# Patient Record
Sex: MALE | Race: OTHER
[De-identification: names, ages, dates, MRNs, and addresses within clinical notes are randomized per-mention and may not be internally consistent; named-entity substitution may affect disease eponyms.]

---

## 2017-02-07 PROBLEM — Z00.00 ENCOUNTER FOR PREVENTIVE HEALTH EXAMINATION: Status: ACTIVE | Noted: 2017-02-07

## 2017-02-15 ENCOUNTER — APPOINTMENT (OUTPATIENT)
Dept: NEUROSURGERY | Facility: CLINIC | Age: 57
End: 2017-02-15

## 2017-02-15 DIAGNOSIS — M54.2 CERVICALGIA: ICD-10-CM

## 2017-02-15 DIAGNOSIS — E78.1 PURE HYPERGLYCERIDEMIA: ICD-10-CM

## 2017-02-15 DIAGNOSIS — I10 ESSENTIAL (PRIMARY) HYPERTENSION: ICD-10-CM

## 2017-02-28 ENCOUNTER — APPOINTMENT (OUTPATIENT)
Dept: NEUROSURGERY | Facility: CLINIC | Age: 57
End: 2017-02-28

## 2017-02-28 VITALS
OXYGEN SATURATION: 97 % | HEART RATE: 96 BPM | TEMPERATURE: 97.9 F | WEIGHT: 210 LBS | HEIGHT: 72 IN | DIASTOLIC BLOOD PRESSURE: 76 MMHG | SYSTOLIC BLOOD PRESSURE: 120 MMHG | BODY MASS INDEX: 28.44 KG/M2

## 2017-02-28 DIAGNOSIS — Z83.3 FAMILY HISTORY OF DIABETES MELLITUS: ICD-10-CM

## 2017-02-28 DIAGNOSIS — Z78.9 OTHER SPECIFIED HEALTH STATUS: ICD-10-CM

## 2017-02-28 DIAGNOSIS — F17.210 NICOTINE DEPENDENCE, CIGARETTES, UNCOMPLICATED: ICD-10-CM

## 2017-02-28 DIAGNOSIS — M54.2 CERVICALGIA: ICD-10-CM

## 2017-02-28 DIAGNOSIS — Z82.49 FAMILY HISTORY OF ISCHEMIC HEART DISEASE AND OTHER DISEASES OF THE CIRCULATORY SYSTEM: ICD-10-CM

## 2017-02-28 DIAGNOSIS — Z82.61 FAMILY HISTORY OF ARTHRITIS: ICD-10-CM

## 2017-02-28 DIAGNOSIS — M54.9 DORSALGIA, UNSPECIFIED: ICD-10-CM

## 2017-02-28 RX ORDER — ZOLPIDEM TARTRATE 10 MG/1
10 TABLET, FILM COATED ORAL
Refills: 0 | Status: ACTIVE | COMMUNITY

## 2017-02-28 RX ORDER — MELOXICAM 15 MG/1
15 TABLET ORAL
Refills: 0 | Status: ACTIVE | COMMUNITY

## 2017-03-01 VITALS — HEART RATE: 7 BPM

## 2017-03-01 PROBLEM — E78.1 HIGH TRIGLYCERIDES: Status: RESOLVED | Noted: 2017-03-01 | Resolved: 2017-03-01

## 2017-03-01 PROBLEM — I10 HIGH BLOOD PRESSURE: Status: RESOLVED | Noted: 2017-03-01 | Resolved: 2017-03-01

## 2017-03-01 RX ORDER — CYCLOBENZAPRINE HYDROCHLORIDE 10 MG/1
10 TABLET, FILM COATED ORAL
Refills: 0 | Status: COMPLETED | COMMUNITY
End: 2017-03-01

## 2017-03-27 ENCOUNTER — APPOINTMENT (OUTPATIENT)
Dept: NEUROSURGERY | Facility: HOSPITAL | Age: 57
End: 2017-03-27

## 2017-04-26 PROBLEM — M54.2 CERVICAL SPINE PAIN: Status: ACTIVE | Noted: 2017-02-28

## 2022-03-15 ENCOUNTER — TRANSCRIPTION ENCOUNTER (OUTPATIENT)
Age: 62
End: 2022-03-15

## 2023-10-05 ENCOUNTER — OFFICE (OUTPATIENT)
Dept: URBAN - METROPOLITAN AREA CLINIC 112 | Facility: CLINIC | Age: 63
Setting detail: OPHTHALMOLOGY
End: 2023-10-05
Payer: COMMERCIAL

## 2023-10-05 DIAGNOSIS — H25.13: ICD-10-CM

## 2023-10-05 DIAGNOSIS — H16.223: ICD-10-CM

## 2023-10-05 DIAGNOSIS — H43.392: ICD-10-CM

## 2023-10-05 DIAGNOSIS — H16.222: ICD-10-CM

## 2023-10-05 PROCEDURE — 83861 MICROFLUID ANALY TEARS: CPT | Mod: QW,LT | Performed by: OPHTHALMOLOGY

## 2023-10-05 PROCEDURE — 92004 COMPRE OPH EXAM NEW PT 1/>: CPT | Performed by: OPHTHALMOLOGY

## 2023-10-05 PROCEDURE — 92250 FUNDUS PHOTOGRAPHY W/I&R: CPT | Performed by: OPHTHALMOLOGY

## 2023-10-05 PROCEDURE — 83861 MICROFLUID ANALY TEARS: CPT | Mod: QW | Performed by: OPHTHALMOLOGY

## 2023-10-05 ASSESSMENT — REFRACTION_AUTOREFRACTION
OS_SPHERE: +0.75
OD_SPHERE: +1.00
OS_CYLINDER: -1.25
OD_AXIS: 066
OS_AXIS: 101
OD_CYLINDER: -0.75

## 2023-10-05 ASSESSMENT — CONFRONTATIONAL VISUAL FIELD TEST (CVF)
OD_FINDINGS: FULL
OS_FINDINGS: FULL

## 2023-10-05 ASSESSMENT — TONOMETRY
OD_IOP_MMHG: 13
OS_IOP_MMHG: 14

## 2023-10-05 ASSESSMENT — VISUAL ACUITY
OS_BCVA: 20/30
OD_BCVA: 20/40

## 2023-10-05 ASSESSMENT — SUPERFICIAL PUNCTATE KERATITIS (SPK)
OS_SPK: 1+
OD_SPK: 1+

## 2023-10-05 ASSESSMENT — SPHEQUIV_DERIVED
OS_SPHEQUIV: 0.125
OD_SPHEQUIV: 0.625

## 2023-10-29 ENCOUNTER — OFFICE (OUTPATIENT)
Dept: URBAN - METROPOLITAN AREA CLINIC 12 | Facility: CLINIC | Age: 63
Setting detail: OPHTHALMOLOGY
End: 2023-10-29
Payer: COMMERCIAL

## 2023-10-29 DIAGNOSIS — H16.223: ICD-10-CM

## 2023-10-29 DIAGNOSIS — H43.392: ICD-10-CM

## 2023-10-29 DIAGNOSIS — H25.13: ICD-10-CM

## 2023-10-29 PROCEDURE — 99213 OFFICE O/P EST LOW 20 MIN: CPT | Performed by: OPHTHALMOLOGY

## 2023-10-29 ASSESSMENT — AXIALLENGTH_DERIVED
OS_AL: 23.0921
OS_AL: 23.0921
OD_AL: 23.0429
OD_AL: 23.0429

## 2023-10-29 ASSESSMENT — REFRACTION_MANIFEST
OD_SPHERE: +1.00
OS_AXIS: 100
OD_CYLINDER: -1.00
OS_VA1: 20/40+3
OD_AXIS: 070
OS_SPHERE: +1.00
OS_CYLINDER: -1.50

## 2023-10-29 ASSESSMENT — TONOMETRY
OS_IOP_MMHG: 13
OD_IOP_MMHG: 11

## 2023-10-29 ASSESSMENT — REFRACTION_AUTOREFRACTION
OS_CYLINDER: -1.50
OD_SPHERE: +1.00
OD_CYLINDER: -1.00
OD_AXIS: 068
OS_AXIS: 101
OS_SPHERE: +1.00

## 2023-10-29 ASSESSMENT — KERATOMETRY
OD_K1POWER_DIOPTERS: 44.25
OS_K1POWER_DIOPTERS: 44.25
OD_K2POWER_DIOPTERS: 44.75
OS_K2POWER_DIOPTERS: 45.00
OS_AXISANGLE_DEGREES: 073
OD_AXISANGLE_DEGREES: 104

## 2023-10-29 ASSESSMENT — SUPERFICIAL PUNCTATE KERATITIS (SPK)
OS_SPK: 1+
OD_SPK: 1+

## 2023-10-29 ASSESSMENT — CONFRONTATIONAL VISUAL FIELD TEST (CVF)
OD_FINDINGS: FULL
OS_FINDINGS: FULL

## 2023-10-29 ASSESSMENT — SPHEQUIV_DERIVED
OD_SPHEQUIV: 0.5
OS_SPHEQUIV: 0.25
OS_SPHEQUIV: 0.25
OD_SPHEQUIV: 0.5

## 2023-10-29 ASSESSMENT — VISUAL ACUITY
OS_BCVA: 20/50+2
OD_BCVA: 20/40+2

## 2023-11-09 ENCOUNTER — OFFICE (OUTPATIENT)
Dept: URBAN - METROPOLITAN AREA CLINIC 12 | Facility: CLINIC | Age: 63
Setting detail: OPHTHALMOLOGY
End: 2023-11-09
Payer: COMMERCIAL

## 2023-11-09 DIAGNOSIS — H25.11: ICD-10-CM

## 2023-11-09 DIAGNOSIS — H25.13: ICD-10-CM

## 2023-11-09 PROCEDURE — 99213 OFFICE O/P EST LOW 20 MIN: CPT | Performed by: OPHTHALMOLOGY

## 2023-11-09 PROCEDURE — 92136 OPHTHALMIC BIOMETRY: CPT | Mod: TC | Performed by: OPHTHALMOLOGY

## 2023-11-09 PROCEDURE — 92136 OPHTHALMIC BIOMETRY: CPT | Mod: 26,RT | Performed by: OPHTHALMOLOGY

## 2023-11-09 ASSESSMENT — CONFRONTATIONAL VISUAL FIELD TEST (CVF)
OD_FINDINGS: FULL
OS_FINDINGS: FULL

## 2023-11-09 ASSESSMENT — SUPERFICIAL PUNCTATE KERATITIS (SPK)
OS_SPK: 1+
OD_SPK: 1+

## 2023-11-10 ASSESSMENT — REFRACTION_MANIFEST
OD_SPHERE: +1.00
OS_CYLINDER: -1.50
OD_AXIS: 070
OD_CYLINDER: -1.00
OS_SPHERE: +1.00
OS_AXIS: 100
OS_VA1: 20/40+3

## 2023-11-10 ASSESSMENT — REFRACTION_AUTOREFRACTION
OD_AXIS: 065
OS_SPHERE: +0.75
OS_CYLINDER: -1.25
OS_AXIS: 106
OD_SPHERE: +1.00
OD_CYLINDER: -1.00

## 2023-11-10 ASSESSMENT — SPHEQUIV_DERIVED
OS_SPHEQUIV: 0.25
OD_SPHEQUIV: 0.5
OS_SPHEQUIV: 0.125
OD_SPHEQUIV: 0.5

## 2023-11-28 ENCOUNTER — ASC (OUTPATIENT)
Dept: URBAN - METROPOLITAN AREA SURGERY 8 | Facility: SURGERY | Age: 63
Setting detail: OPHTHALMOLOGY
End: 2023-11-28
Payer: COMMERCIAL

## 2023-11-28 DIAGNOSIS — H52.211: ICD-10-CM

## 2023-11-28 DIAGNOSIS — H25.11: ICD-10-CM

## 2023-11-28 PROCEDURE — 66984 XCAPSL CTRC RMVL W/O ECP: CPT | Mod: RT | Performed by: OPHTHALMOLOGY

## 2023-11-28 PROCEDURE — FEMTO FEMTOSECOND LASER: Mod: GY | Performed by: OPHTHALMOLOGY

## 2023-11-29 ENCOUNTER — RX ONLY (RX ONLY)
Age: 63
End: 2023-11-29

## 2023-11-29 ENCOUNTER — OFFICE (OUTPATIENT)
Dept: URBAN - METROPOLITAN AREA CLINIC 12 | Facility: CLINIC | Age: 63
Setting detail: OPHTHALMOLOGY
End: 2023-11-29
Payer: COMMERCIAL

## 2023-11-29 DIAGNOSIS — Z96.1: ICD-10-CM

## 2023-11-29 PROBLEM — H25.12 CATARACT SENILE NUCLEAR SCLEROSIS; LEFT EYE: Status: ACTIVE | Noted: 2023-11-29

## 2023-11-29 PROCEDURE — 99024 POSTOP FOLLOW-UP VISIT: CPT | Performed by: OPTOMETRIST

## 2023-11-29 ASSESSMENT — SUPERFICIAL PUNCTATE KERATITIS (SPK)
OD_SPK: 1+
OS_SPK: 1+

## 2023-11-29 ASSESSMENT — REFRACTION_MANIFEST
OS_CYLINDER: -1.50
OD_SPHERE: +1.00
OS_AXIS: 100
OD_CYLINDER: -1.00
OS_SPHERE: +1.00
OS_VA1: 20/40+3
OD_AXIS: 070

## 2023-11-29 ASSESSMENT — SPHEQUIV_DERIVED
OS_SPHEQUIV: 0.25
OS_SPHEQUIV: 0
OD_SPHEQUIV: 0.5

## 2023-11-29 ASSESSMENT — REFRACTION_AUTOREFRACTION
OD_CYLINDER: -0.50
OD_SPHERE: PLANO
OS_CYLINDER: -1.00
OD_AXIS: 026
OS_AXIS: 108
OS_SPHERE: +0.50

## 2023-11-29 ASSESSMENT — CONFRONTATIONAL VISUAL FIELD TEST (CVF)
OS_FINDINGS: FULL
OD_FINDINGS: FULL

## 2023-12-04 ENCOUNTER — OFFICE (OUTPATIENT)
Dept: URBAN - METROPOLITAN AREA CLINIC 12 | Facility: CLINIC | Age: 63
Setting detail: OPHTHALMOLOGY
End: 2023-12-04
Payer: COMMERCIAL

## 2023-12-04 DIAGNOSIS — H25.12: ICD-10-CM

## 2023-12-04 PROCEDURE — 92136 OPHTHALMIC BIOMETRY: CPT | Mod: 26,LT | Performed by: OPHTHALMOLOGY

## 2023-12-04 ASSESSMENT — REFRACTION_MANIFEST
OS_VA1: 20/40+3
OS_AXIS: 100
OD_AXIS: 070
OD_SPHERE: +1.00
OS_SPHERE: +1.00
OD_CYLINDER: -1.00
OS_CYLINDER: -1.50

## 2023-12-04 ASSESSMENT — REFRACTION_AUTOREFRACTION
OS_SPHERE: +0.75
OD_AXIS: 048
OD_SPHERE: PLANO
OD_CYLINDER: -0.75
OS_AXIS: 101
OS_CYLINDER: -1.25

## 2023-12-04 ASSESSMENT — SUPERFICIAL PUNCTATE KERATITIS (SPK)
OS_SPK: 1+
OD_SPK: 1+

## 2023-12-04 ASSESSMENT — CONFRONTATIONAL VISUAL FIELD TEST (CVF)
OS_FINDINGS: FULL
OD_FINDINGS: FULL

## 2023-12-04 ASSESSMENT — SPHEQUIV_DERIVED
OS_SPHEQUIV: 0.125
OS_SPHEQUIV: 0.25
OD_SPHEQUIV: 0.5

## 2023-12-12 ENCOUNTER — ASC (OUTPATIENT)
Dept: URBAN - METROPOLITAN AREA SURGERY 8 | Facility: SURGERY | Age: 63
Setting detail: OPHTHALMOLOGY
End: 2023-12-12
Payer: COMMERCIAL

## 2023-12-12 DIAGNOSIS — H52.212: ICD-10-CM

## 2023-12-12 DIAGNOSIS — H25.12: ICD-10-CM

## 2023-12-12 PROCEDURE — 66984 XCAPSL CTRC RMVL W/O ECP: CPT | Mod: 79,LT | Performed by: OPHTHALMOLOGY

## 2023-12-12 PROCEDURE — FEMTO FEMTOSECOND LASER: Mod: GY | Performed by: OPHTHALMOLOGY

## 2023-12-13 ENCOUNTER — OFFICE (OUTPATIENT)
Dept: URBAN - METROPOLITAN AREA CLINIC 12 | Facility: CLINIC | Age: 63
Setting detail: OPHTHALMOLOGY
End: 2023-12-13
Payer: COMMERCIAL

## 2023-12-13 DIAGNOSIS — Z96.1: ICD-10-CM

## 2023-12-13 PROCEDURE — 99024 POSTOP FOLLOW-UP VISIT: CPT | Performed by: OPTOMETRIST

## 2023-12-13 ASSESSMENT — REFRACTION_MANIFEST
OS_SPHERE: +1.00
OS_CYLINDER: -1.50
OS_AXIS: 100
OD_AXIS: 070
OD_CYLINDER: -1.00
OD_SPHERE: +1.00
OS_VA1: 20/40+3

## 2023-12-13 ASSESSMENT — SUPERFICIAL PUNCTATE KERATITIS (SPK)
OD_SPK: 1+
OS_SPK: 1+

## 2023-12-13 ASSESSMENT — REFRACTION_AUTOREFRACTION
OD_SPHERE: PLANO
OS_AXIS: 1
OD_CYLINDER: -0.50
OS_SPHERE: PLANO
OD_AXIS: 31
OS_CYLINDER: -0.50

## 2023-12-13 ASSESSMENT — SPHEQUIV_DERIVED
OS_SPHEQUIV: 0.25
OD_SPHEQUIV: 0.5

## 2023-12-13 ASSESSMENT — CONFRONTATIONAL VISUAL FIELD TEST (CVF)
OS_FINDINGS: FULL
OD_FINDINGS: FULL

## 2023-12-20 ENCOUNTER — RX ONLY (RX ONLY)
Age: 63
End: 2023-12-20

## 2023-12-20 ENCOUNTER — OFFICE (OUTPATIENT)
Dept: URBAN - METROPOLITAN AREA CLINIC 12 | Facility: CLINIC | Age: 63
Setting detail: OPHTHALMOLOGY
End: 2023-12-20
Payer: COMMERCIAL

## 2023-12-20 DIAGNOSIS — Z96.1: ICD-10-CM

## 2023-12-20 PROCEDURE — 99024 POSTOP FOLLOW-UP VISIT: CPT | Performed by: OPTOMETRIST

## 2023-12-20 ASSESSMENT — SPHEQUIV_DERIVED
OD_SPHEQUIV: -0.5
OS_SPHEQUIV: 0.25
OD_SPHEQUIV: 0.5

## 2023-12-20 ASSESSMENT — SUPERFICIAL PUNCTATE KERATITIS (SPK)
OD_SPK: 1+
OS_SPK: 1+

## 2023-12-20 ASSESSMENT — CONFRONTATIONAL VISUAL FIELD TEST (CVF)
OS_FINDINGS: FULL
OD_FINDINGS: FULL

## 2023-12-20 ASSESSMENT — REFRACTION_AUTOREFRACTION
OD_SPHERE: -0.25
OS_CYLINDER: -0.25
OS_SPHERE: PLANO
OS_AXIS: 177
OD_AXIS: 035
OD_CYLINDER: -0.50

## 2023-12-20 ASSESSMENT — REFRACTION_MANIFEST
OD_AXIS: 070
OS_VA1: 20/40+3
OS_SPHERE: +1.00
OS_AXIS: 100
OS_CYLINDER: -1.50
OD_CYLINDER: -1.00
OD_SPHERE: +1.00

## 2024-01-22 ENCOUNTER — OFFICE (OUTPATIENT)
Dept: URBAN - METROPOLITAN AREA CLINIC 12 | Facility: CLINIC | Age: 64
Setting detail: OPHTHALMOLOGY
End: 2024-01-22
Payer: COMMERCIAL

## 2024-01-22 DIAGNOSIS — Z96.1: ICD-10-CM

## 2024-01-22 DIAGNOSIS — H16.223: ICD-10-CM

## 2024-01-22 PROCEDURE — 99024 POSTOP FOLLOW-UP VISIT: CPT | Performed by: OPTOMETRIST

## 2024-01-22 ASSESSMENT — REFRACTION_MANIFEST
OD_AXIS: 070
OS_AXIS: 100
OS_SPHERE: +1.00
OD_CYLINDER: -1.00
OS_CYLINDER: -1.50
OS_VA1: 20/40+3
OD_SPHERE: +1.00

## 2024-01-22 ASSESSMENT — SPHEQUIV_DERIVED
OD_SPHEQUIV: -0.375
OD_SPHEQUIV: 0.5
OS_SPHEQUIV: -0.375
OS_SPHEQUIV: 0.25

## 2024-01-22 ASSESSMENT — CONFRONTATIONAL VISUAL FIELD TEST (CVF)
OD_FINDINGS: FULL
OS_FINDINGS: FULL

## 2024-01-22 ASSESSMENT — SUPERFICIAL PUNCTATE KERATITIS (SPK)
OS_SPK: 1+
OD_SPK: 1+

## 2024-01-22 ASSESSMENT — REFRACTION_AUTOREFRACTION
OD_AXIS: 046
OD_SPHERE: -0.25
OS_CYLINDER: -0.25
OD_CYLINDER: -0.25
OS_AXIS: 155
OS_SPHERE: -0.25

## 2024-04-22 ENCOUNTER — OFFICE (OUTPATIENT)
Dept: URBAN - METROPOLITAN AREA CLINIC 12 | Facility: CLINIC | Age: 64
Setting detail: OPHTHALMOLOGY
End: 2024-04-22
Payer: COMMERCIAL

## 2024-04-22 DIAGNOSIS — H43.392: ICD-10-CM

## 2024-04-22 DIAGNOSIS — H16.223: ICD-10-CM

## 2024-04-22 DIAGNOSIS — Z96.1: ICD-10-CM

## 2024-04-22 PROCEDURE — 92014 COMPRE OPH EXAM EST PT 1/>: CPT | Performed by: OPTOMETRIST

## 2024-07-22 ENCOUNTER — OFFICE (OUTPATIENT)
Dept: URBAN - METROPOLITAN AREA CLINIC 12 | Facility: CLINIC | Age: 64
Setting detail: OPHTHALMOLOGY
End: 2024-07-22
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
## 2024-07-22 PROCEDURE — NO SHOW FE NO SHOW FEE: Performed by: OPTOMETRIST

## 2024-07-25 ENCOUNTER — APPOINTMENT (OUTPATIENT)
Dept: ORTHOPEDIC SURGERY | Facility: CLINIC | Age: 64
End: 2024-07-25
Payer: COMMERCIAL

## 2024-07-25 DIAGNOSIS — M51.36 OTHER INTERVERTEBRAL DISC DEGENERATION, LUMBAR REGION: ICD-10-CM

## 2024-07-25 DIAGNOSIS — M51.37 OTHER INTERVERTEBRAL DISC DEGENERATION, LUMBOSACRAL REGION: ICD-10-CM

## 2024-07-25 DIAGNOSIS — M47.817 SPONDYLOSIS W/OUT MYELOPATHY OR RADICULOPATHY, LUMBOSACRAL REGION: ICD-10-CM

## 2024-07-25 DIAGNOSIS — Z87.19 PERSONAL HISTORY OF OTHER DISEASES OF THE DIGESTIVE SYSTEM: ICD-10-CM

## 2024-07-25 DIAGNOSIS — M48.061 SPINAL STENOSIS, LUMBAR REGION WITHOUT NEUROGENIC CLAUDICATION: ICD-10-CM

## 2024-07-25 DIAGNOSIS — M43.16 SPONDYLOLISTHESIS, LUMBAR REGION: ICD-10-CM

## 2024-07-25 PROCEDURE — 99204 OFFICE O/P NEW MOD 45 MIN: CPT

## 2024-07-25 PROCEDURE — 72100 X-RAY EXAM L-S SPINE 2/3 VWS: CPT

## 2024-07-25 RX ORDER — METHYLPREDNISOLONE 4 MG/1
4 TABLET ORAL
Qty: 1 | Refills: 0 | Status: ACTIVE | COMMUNITY
Start: 2024-07-25 | End: 1900-01-01

## 2024-07-25 RX ORDER — METHOCARBAMOL 750 MG/1
750 TABLET, FILM COATED ORAL
Qty: 60 | Refills: 1 | Status: ACTIVE | COMMUNITY
Start: 2024-07-25 | End: 1900-01-01

## 2024-07-25 NOTE — ASSESSMENT
[FreeTextEntry1] : 62 yo male presents today for eval of his low back pain. XR shows spondylolisthesis L4-5 and DDD L4-S1. I recommend PT and medicine for relief. Will consider MRI at future visit.   - Recommend physical therapy to regain range of motion, strengthening and symptomatic improvement. Prescription given in office today.    - Patient will start HEP as detailed in home exercise booklet given in office. Emphasized daily stretching and strengthening.   - Patient will begin Medrol.  Patient advised not to take any NSAIDs while taking the steroids.   - Patient given prescription for Robaxin 750mg today. Advised patient that medication may make them drowsy, start by taking it at night.   - Recommend NSAIDs PRN - Recommend heating pad use to decrease muscle spasm - Discussed the importance of home exercises, including but not limited to hamstring stretching and core strengthening   Patient was educated on their diagnosis today. All questions answered and patient expressed understanding.  Follow up in 2 months

## 2024-07-25 NOTE — IMAGING
[Facet arthropathy] : Facet arthropathy [Disc space narrowing] : Disc space narrowing [Spondylolithesis] : Spondylolithesis

## 2024-07-25 NOTE — HISTORY OF PRESENT ILLNESS
[de-identified] : Patient presents today with lower back pain ongoing for years with NKI (pt seen in 2019 for the neck)  Reports pain in right side of lower back- worse with walking, increased activity, and prolonged sitting Reports pain radiating into buttock and bilat thighs- worse when sitting  Pt reports using icyhot and taking Ibuprofen with some relief.  Pt reports having injections with PM ~4 years ago with short term relief Reports going to PT ~3 years ago with no relief.

## 2024-09-26 ENCOUNTER — APPOINTMENT (OUTPATIENT)
Dept: ORTHOPEDIC SURGERY | Facility: CLINIC | Age: 64
End: 2024-09-26

## 2024-12-02 ENCOUNTER — OFFICE (OUTPATIENT)
Dept: URBAN - METROPOLITAN AREA CLINIC 12 | Facility: CLINIC | Age: 64
Setting detail: OPHTHALMOLOGY
End: 2024-12-02
Payer: COMMERCIAL

## 2024-12-02 DIAGNOSIS — H16.223: ICD-10-CM

## 2024-12-02 DIAGNOSIS — H43.392: ICD-10-CM

## 2024-12-02 PROCEDURE — 92014 COMPRE OPH EXAM EST PT 1/>: CPT | Performed by: OPTOMETRIST

## 2024-12-02 ASSESSMENT — KERATOMETRY
METHOD_AUTO_MANUAL: AUTO
OS_K2POWER_DIOPTERS: 45.25
OS_K1POWER_DIOPTERS: 44.75
OD_K1POWER_DIOPTERS: 44.25
OD_AXISANGLE_DEGREES: 120
OS_AXISANGLE_DEGREES: 087
OD_K2POWER_DIOPTERS: 45.00

## 2024-12-02 ASSESSMENT — TONOMETRY
OD_IOP_MMHG: 11
OS_IOP_MMHG: 10

## 2024-12-02 ASSESSMENT — REFRACTION_MANIFEST
OD_SPHERE: +1.00
OS_AXIS: 100
OD_AXIS: 070
OS_VA1: 20/40+3
OS_CYLINDER: -1.50
OS_SPHERE: +1.00
OD_CYLINDER: -1.00

## 2024-12-02 ASSESSMENT — REFRACTION_AUTOREFRACTION
OS_CYLINDER: -0.25
OD_CYLINDER: -0.50
OD_SPHERE: PLANO
OD_AXIS: 047
OS_SPHERE: PLANO
OS_AXIS: 162

## 2024-12-02 ASSESSMENT — VISUAL ACUITY
OS_BCVA: 20/25-2
OD_BCVA: 20/30

## 2024-12-02 ASSESSMENT — CONFRONTATIONAL VISUAL FIELD TEST (CVF)
OD_FINDINGS: FULL
OS_FINDINGS: FULL

## 2024-12-02 ASSESSMENT — SUPERFICIAL PUNCTATE KERATITIS (SPK)
OS_SPK: 1+
OD_SPK: 1+

## 2024-12-05 ENCOUNTER — OFFICE (OUTPATIENT)
Dept: URBAN - METROPOLITAN AREA CLINIC 88 | Facility: CLINIC | Age: 64
Setting detail: OPHTHALMOLOGY
End: 2024-12-05
Payer: COMMERCIAL

## 2024-12-05 DIAGNOSIS — H43.812: ICD-10-CM

## 2024-12-05 DIAGNOSIS — H33.312: ICD-10-CM

## 2024-12-05 PROCEDURE — 67145 PROPH RTA DTCHMNT PC: CPT | Mod: LT | Performed by: OPHTHALMOLOGY

## 2024-12-05 PROCEDURE — 99214 OFFICE O/P EST MOD 30 MIN: CPT | Mod: 25 | Performed by: OPHTHALMOLOGY

## 2024-12-05 PROCEDURE — 92250 FUNDUS PHOTOGRAPHY W/I&R: CPT | Performed by: OPHTHALMOLOGY

## 2024-12-05 ASSESSMENT — REFRACTION_AUTOREFRACTION
OD_CYLINDER: -0.50
OS_CYLINDER: -0.25
OD_SPHERE: PLANO
OS_SPHERE: PLANO
OD_AXIS: 047
OS_AXIS: 162

## 2024-12-05 ASSESSMENT — TONOMETRY
OS_IOP_MMHG: 11
OD_IOP_MMHG: 13

## 2024-12-05 ASSESSMENT — KERATOMETRY
OD_K1POWER_DIOPTERS: 44.25
OS_K1POWER_DIOPTERS: 44.75
OD_K2POWER_DIOPTERS: 45.00
OD_AXISANGLE_DEGREES: 120
METHOD_AUTO_MANUAL: AUTO
OS_AXISANGLE_DEGREES: 087
OS_K2POWER_DIOPTERS: 45.25

## 2024-12-05 ASSESSMENT — VISUAL ACUITY
OS_BCVA: 20/25-2
OD_BCVA: 20/30

## 2024-12-05 ASSESSMENT — SUPERFICIAL PUNCTATE KERATITIS (SPK)
OD_SPK: 1+
OS_SPK: 1+

## 2024-12-05 ASSESSMENT — CONFRONTATIONAL VISUAL FIELD TEST (CVF)
OS_FINDINGS: FULL
OD_FINDINGS: FULL

## 2024-12-19 ENCOUNTER — OFFICE (OUTPATIENT)
Dept: URBAN - METROPOLITAN AREA CLINIC 88 | Facility: CLINIC | Age: 64
Setting detail: OPHTHALMOLOGY
End: 2024-12-19
Payer: COMMERCIAL

## 2024-12-19 DIAGNOSIS — H43.812: ICD-10-CM

## 2024-12-19 DIAGNOSIS — H33.312: ICD-10-CM

## 2024-12-19 PROBLEM — H26.493 POSTERIOR CAPSULAR OPACIFICATION; BOTH EYES: Status: ACTIVE | Noted: 2024-12-02

## 2024-12-19 PROCEDURE — 92250 FUNDUS PHOTOGRAPHY W/I&R: CPT | Performed by: OPHTHALMOLOGY

## 2024-12-19 PROCEDURE — 99212 OFFICE O/P EST SF 10 MIN: CPT | Performed by: OPHTHALMOLOGY

## 2024-12-19 ASSESSMENT — TONOMETRY
OS_IOP_MMHG: 10
OD_IOP_MMHG: 10

## 2024-12-19 ASSESSMENT — CONFRONTATIONAL VISUAL FIELD TEST (CVF)
OS_FINDINGS: FULL
OD_FINDINGS: FULL

## 2024-12-19 ASSESSMENT — REFRACTION_AUTOREFRACTION
OS_CYLINDER: -0.25
OS_SPHERE: PLANO
OS_AXIS: 162
OD_AXIS: 047
OD_CYLINDER: -0.50
OD_SPHERE: PLANO

## 2024-12-19 ASSESSMENT — KERATOMETRY
OS_K2POWER_DIOPTERS: 45.25
OD_AXISANGLE_DEGREES: 120
OD_K1POWER_DIOPTERS: 44.25
OS_AXISANGLE_DEGREES: 087
METHOD_AUTO_MANUAL: AUTO
OS_K1POWER_DIOPTERS: 44.75
OD_K2POWER_DIOPTERS: 45.00

## 2024-12-19 ASSESSMENT — SUPERFICIAL PUNCTATE KERATITIS (SPK)
OD_SPK: 1+
OS_SPK: 1+

## 2024-12-19 ASSESSMENT — VISUAL ACUITY
OD_BCVA: 20/30-1
OS_BCVA: 20/25-2

## 2025-01-26 ENCOUNTER — OFFICE (OUTPATIENT)
Dept: URBAN - METROPOLITAN AREA CLINIC 12 | Facility: CLINIC | Age: 65
Setting detail: OPHTHALMOLOGY
End: 2025-01-26
Payer: COMMERCIAL

## 2025-01-26 DIAGNOSIS — H43.812: ICD-10-CM

## 2025-01-26 DIAGNOSIS — Z96.1: ICD-10-CM

## 2025-01-26 DIAGNOSIS — H26.493: ICD-10-CM

## 2025-01-26 DIAGNOSIS — H16.223: ICD-10-CM

## 2025-01-26 PROCEDURE — 99214 OFFICE O/P EST MOD 30 MIN: CPT | Performed by: OPHTHALMOLOGY

## 2025-01-26 ASSESSMENT — KERATOMETRY
OD_K1POWER_DIOPTERS: 44.25
OS_K2POWER_DIOPTERS: 45.25
OD_K2POWER_DIOPTERS: 44.75
METHOD_AUTO_MANUAL: AUTO
OS_K1POWER_DIOPTERS: 44.50
OS_AXISANGLE_DEGREES: 087
OD_AXISANGLE_DEGREES: 104

## 2025-01-26 ASSESSMENT — REFRACTION_AUTOREFRACTION
OD_CYLINDER: -0.25
OS_AXIS: 005
OS_SPHERE: -0.25
OD_AXIS: 048
OS_CYLINDER: -0.25
OD_SPHERE: PLANO

## 2025-01-26 ASSESSMENT — REFRACTION_MANIFEST
OS_SPHERE: -0.25
OD_SPHERE: PLANO
OD_VA1: 20/20-2
OD_CYLINDER: -0.25
OD_AXIS: 050
OS_CYLINDER: -0.25
OS_VA1: 20/30
OS_AXIS: 005

## 2025-01-26 ASSESSMENT — SUPERFICIAL PUNCTATE KERATITIS (SPK)
OD_SPK: 1+
OS_SPK: 1+

## 2025-01-26 ASSESSMENT — CONFRONTATIONAL VISUAL FIELD TEST (CVF)
OD_FINDINGS: FULL
OS_FINDINGS: FULL

## 2025-01-26 ASSESSMENT — TONOMETRY
OD_IOP_MMHG: 16
OS_IOP_MMHG: 16

## 2025-01-26 ASSESSMENT — VISUAL ACUITY
OS_BCVA: 20/30+2
OD_BCVA: 20/30+2

## 2025-02-06 ENCOUNTER — OFFICE (OUTPATIENT)
Dept: URBAN - METROPOLITAN AREA CLINIC 88 | Facility: CLINIC | Age: 65
Setting detail: OPHTHALMOLOGY
End: 2025-02-06
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
## 2025-02-06 PROCEDURE — NO SHOW FE NO SHOW FEE: Performed by: OPHTHALMOLOGY

## 2025-02-27 ENCOUNTER — OFFICE (OUTPATIENT)
Dept: URBAN - METROPOLITAN AREA CLINIC 88 | Facility: CLINIC | Age: 65
Setting detail: OPHTHALMOLOGY
End: 2025-02-27
Payer: COMMERCIAL

## 2025-02-27 DIAGNOSIS — H43.812: ICD-10-CM

## 2025-02-27 DIAGNOSIS — H26.493: ICD-10-CM

## 2025-02-27 DIAGNOSIS — H43.821: ICD-10-CM

## 2025-02-27 PROCEDURE — 92134 CPTRZ OPH DX IMG PST SGM RTA: CPT | Performed by: OPHTHALMOLOGY

## 2025-02-27 PROCEDURE — 99213 OFFICE O/P EST LOW 20 MIN: CPT | Performed by: OPHTHALMOLOGY

## 2025-02-27 ASSESSMENT — KERATOMETRY
METHOD_AUTO_MANUAL: AUTO
OD_K2POWER_DIOPTERS: 44.75
OD_K1POWER_DIOPTERS: 44.25
OD_AXISANGLE_DEGREES: 104
OS_K2POWER_DIOPTERS: 45.25
OS_AXISANGLE_DEGREES: 087
OS_K1POWER_DIOPTERS: 44.50

## 2025-02-27 ASSESSMENT — REFRACTION_AUTOREFRACTION
OD_CYLINDER: -0.25
OD_SPHERE: PLANO
OS_CYLINDER: -0.25
OS_AXIS: 005
OS_SPHERE: -0.25
OD_AXIS: 048

## 2025-02-27 ASSESSMENT — VISUAL ACUITY
OD_BCVA: 20/25+1
OS_BCVA: 20/30+2

## 2025-02-27 ASSESSMENT — SUPERFICIAL PUNCTATE KERATITIS (SPK)
OS_SPK: 1+
OD_SPK: 1+

## 2025-02-27 ASSESSMENT — REFRACTION_MANIFEST
OD_SPHERE: PLANO
OD_VA1: 20/20-2
OS_AXIS: 005
OS_SPHERE: -0.25
OS_VA1: 20/30
OD_AXIS: 050
OS_CYLINDER: -0.25
OD_CYLINDER: -0.25

## 2025-02-27 ASSESSMENT — CONFRONTATIONAL VISUAL FIELD TEST (CVF)
OS_FINDINGS: FULL
OD_FINDINGS: FULL

## 2025-02-27 ASSESSMENT — TONOMETRY: OS_IOP_MMHG: 11

## 2025-02-28 PROBLEM — H43.821 VITREOMACULAR ADHESION; RIGHT EYE: Status: ACTIVE | Noted: 2025-02-27

## 2025-02-28 PROBLEM — H26.491 POSTERIOR CAPSULAR OPACIFICATION; RIGHT EYE: Status: ACTIVE | Noted: 2025-02-28

## 2025-04-04 ENCOUNTER — RX ONLY (RX ONLY)
Age: 65
End: 2025-04-04

## 2025-04-04 ENCOUNTER — ASC (OUTPATIENT)
Dept: URBAN - METROPOLITAN AREA SURGERY 8 | Facility: SURGERY | Age: 65
Setting detail: OPHTHALMOLOGY
End: 2025-04-04
Payer: COMMERCIAL

## 2025-04-04 DIAGNOSIS — H26.492: ICD-10-CM

## 2025-04-04 PROCEDURE — 66821 AFTER CATARACT LASER SURGERY: CPT | Mod: 79,LT | Performed by: OPHTHALMOLOGY

## 2025-04-04 ASSESSMENT — REFRACTION_MANIFEST
OD_CYLINDER: -0.25
OD_VA1: 20/20-2
OS_VA1: 20/30
OS_AXIS: 005
OD_SPHERE: PLANO
OS_SPHERE: -0.25
OS_CYLINDER: -0.25
OD_AXIS: 050

## 2025-04-04 ASSESSMENT — CONFRONTATIONAL VISUAL FIELD TEST (CVF)
OS_FINDINGS: FULL
OD_FINDINGS: FULL

## 2025-04-04 ASSESSMENT — REFRACTION_AUTOREFRACTION
OS_CYLINDER: -0.25
OS_AXIS: 005
OS_SPHERE: -0.25
OD_SPHERE: PLANO
OD_AXIS: 048
OD_CYLINDER: -0.25

## 2025-04-04 ASSESSMENT — VISUAL ACUITY
OD_BCVA: 20/25+1
OS_BCVA: 20/30+2

## 2025-04-04 ASSESSMENT — SUPERFICIAL PUNCTATE KERATITIS (SPK)
OD_SPK: 1+
OS_SPK: 1+

## 2025-04-04 ASSESSMENT — KERATOMETRY
OD_K2POWER_DIOPTERS: 44.75
OS_AXISANGLE_DEGREES: 087
OS_K2POWER_DIOPTERS: 45.25
METHOD_AUTO_MANUAL: AUTO
OS_K1POWER_DIOPTERS: 44.50
OD_AXISANGLE_DEGREES: 104
OD_K1POWER_DIOPTERS: 44.25

## 2025-06-05 ENCOUNTER — OFFICE (OUTPATIENT)
Dept: URBAN - METROPOLITAN AREA CLINIC 88 | Facility: CLINIC | Age: 65
Setting detail: OPHTHALMOLOGY
End: 2025-06-05
Payer: COMMERCIAL

## 2025-06-05 DIAGNOSIS — H43.821: ICD-10-CM

## 2025-06-05 PROCEDURE — 99024 POSTOP FOLLOW-UP VISIT: CPT | Mod: 25 | Performed by: OPHTHALMOLOGY

## 2025-06-05 PROCEDURE — 92134 CPTRZ OPH DX IMG PST SGM RTA: CPT | Performed by: OPHTHALMOLOGY

## 2025-06-05 ASSESSMENT — KERATOMETRY
OS_K2POWER_DIOPTERS: 45.25
METHOD_AUTO_MANUAL: AUTO
OS_AXISANGLE_DEGREES: 087
OD_AXISANGLE_DEGREES: 104
OD_K2POWER_DIOPTERS: 44.75
OD_K1POWER_DIOPTERS: 44.25
OS_K1POWER_DIOPTERS: 44.50

## 2025-06-05 ASSESSMENT — TONOMETRY: OS_IOP_MMHG: 10

## 2025-06-05 ASSESSMENT — REFRACTION_AUTOREFRACTION
OS_CYLINDER: -0.25
OD_AXIS: 048
OD_CYLINDER: -0.25
OD_SPHERE: PLANO
OS_SPHERE: -0.25
OS_AXIS: 005

## 2025-06-05 ASSESSMENT — VISUAL ACUITY
OD_BCVA: 20/20-2
OS_BCVA: 20/20-

## 2025-06-05 ASSESSMENT — SUPERFICIAL PUNCTATE KERATITIS (SPK)
OS_SPK: 1+
OD_SPK: 1+

## 2025-06-05 ASSESSMENT — CONFRONTATIONAL VISUAL FIELD TEST (CVF)
OS_FINDINGS: FULL
OD_FINDINGS: FULL